# Patient Record
(demographics unavailable — no encounter records)

---

## 2025-03-14 NOTE — HISTORY OF PRESENT ILLNESS
[FreeTextEntry6] : weight check breastfeeding 10-11x a day pumping-getting 2 ounces when pumping also giving 2 ounces of formula stool-3x a day-yellow, seedy urine-6-7x aday sleeping in bassinet on back-nothing else in bassinet [place]=1

## 2025-03-14 NOTE — DISCUSSION/SUMMARY
[FreeTextEntry1] : lance 7 day old gained weight since last visit 2 days ago will  vit D today mom to restart MVI encouraged breastfeeding 12x a day safety discussed refused RSV vaccine follow up one week for weight check

## 2025-03-14 NOTE — PHYSICAL EXAM
[Yong: ____] : Yong [unfilled] [Normal External Genitalia] : normal external genitalia [Circumcised] : circumcised [NL] : warm, clear [FreeTextEntry9] : cord drying

## 2025-03-17 NOTE — END OF VISIT
[] : Resident [FreeTextEntry3] : recommended Beyfortus but parents declined; discussed benefits of immunization and possible complications of RSV infection return in 2-3 days for close F/U due to significant weight loss ~ 12% from birth weight adequate voiding and stooling, baby appears well-hydrated but jaundiced TCB not significantly elevated, no indication for serum testing

## 2025-03-17 NOTE — DEVELOPMENTAL MILESTONES
[Normal Development] : Normal Development [Calms to adult voice] : calms to adult voice [Reflexively moves arms and legs] : reflexively moves arms and legs [Holds fingers closed] : holds fingers closed [Grasps reflexively] : grasp reflexively

## 2025-03-17 NOTE — HISTORY OF PRESENT ILLNESS
[Born at ___ Wks Gestation] : The patient was born at [unfilled] weeks gestation [] : via normal spontaneous vaginal delivery [[facility]] : at [facility] [(1) _____] : [unfilled] [(5) _____] : [unfilled] [None] : There were no delivery complications [BW: _____] : weight of [unfilled] [DW: _____] : Discharge weight was [unfilled] [Time of Birth: _____] : Time of birth was [unfilled] [Age: ___] : [unfilled] year old mother [G: ___] : G [unfilled] [P: ___] : P [unfilled] [Significant Hx: ____] : The mother's  medical history is significant for [unfilled] [GBS] : GBS positive [Rubella (Immune)] : Rubella immune [GDM] : GDM [Antibiotics: ______] : antibiotics ([unfilled]) [TcB: _____] : Transcutaneous Bilirubin [unfilled] mg/dL [Phototherapy Threshold: _____] : Phototherapy level per Bilitool [unfilled] (mg/dL) [Yes] : Yes [] : Circumcision: Yes [Length: _____] : length of [unfilled] [HC: _____] : head circumference of [unfilled] [Hepatitis B Vaccine Given] : Hepatitis B vaccine given [Breast milk] : breast milk [Expressed Breast milk ___oz/feed] : [unfilled] oz of expressed breast milk per feed [Formula ___ oz/feed] : [unfilled] oz of formula per feed [Hours between feeds ___] : Child is fed every [unfilled] hours [RSV vaccine] : RSV vaccine not received by mother at least 14 days prior to delivery [HepBsAG] : HepBsAg negative [HIV] : HIV negative [HepC] : Hepatitis C negative [VDRL/RPR (Reactive)] : VDRL/RPR nonreactive [de-identified] : 40 [Normal] : Normal [___ voids per day] : [unfilled] voids per day [Frequency of stools: ___] : Frequency of stools: [unfilled]  stools [per day] : per day. [Yellow] : yellow [Loose] : loose consistency [In Bassinet/Crib] : sleeps in bassinet/crib [On back] : sleeps on back [Loose bedding, pillow, toys, and/or bumpers in crib] : no loose bedding, pillow, toys, and/or bumpers in crib [No] : No cigarette smoke exposure [Rear facing car seat in back seat] : Rear facing car seat in back seat [Nirsevimab Given] : Nirsevimab not given [de-identified] : good breastmilk production, but poor latch; feeding mostly [id number]  [FreeTextEntry1] :  Peds not called to delivery. 37.2 wk SGA male born via  to a 27 y/o  mother. Maternal history of GDMA1, VSD repair in . Fetal echo completed on 24, normal fetal cardiac anatomy and function. Maternal labs include Blood Type B+, HIV - , RPR NR , Rubella NI , Hep B - , GBS + (received amp x4). ROM with clear fluids (ROM hours: [id number]). Highest maternal temp: 98.6C. EOS 0.06. Baby emerged vigorous, crying, was warmed, dried, suctioned, and stimulated with APGARS of 9/9. Resuscitation included: bulb suction and stimulation, CPAP for 8min due to nasal flaring and grunting. NICU team assessed baby. Discharge weight 2285g, down 9.7% from birth weight. Discharge Bilirubin transcutaneous 6.4 at 40 hours of life, phototherapy threshold 14.2  Cardiology recommended F/U at 1-2 months of age

## 2025-03-17 NOTE — PHYSICAL EXAM
[Alert] : alert [Normocephalic] : normocephalic [Flat Open Anterior [place]] : flat open anterior fontanelle [PERRL] : PERRL [Red Reflex Bilateral] : red reflex bilateral [Normally Placed Ears] : normally placed ears [Auricles Well Formed] : auricles well formed [Clear Tympanic membranes] : clear tympanic membranes [Light reflex present] : light reflex present [Bony structures visible] : bony structures visible [Patent Auditory Canal] : patent auditory canal [Nares Patent] : nares patent [Palate Intact] : palate intact [Uvula Midline] : uvula midline [Supple, full passive range of motion] : supple, full passive range of motion [Palpable Masses] : no palpable masses [Symmetric Chest Rise] : symmetric chest rise [Clear to Auscultation Bilaterally] : clear to auscultation bilaterally [Regular Rate and Rhythm] : regular rate and rhythm [S1, S2 present] : S1, S2 present [+2 Femoral Pulses] : +2 femoral pulses [Soft] : soft [Bowel Sounds] : bowel sounds present [Umbilical Stump Dry, Clean, Intact] : umbilical stump dry, clean, intact [Splenomegaly] : no splenomegaly [Normal external genitailia] : normal external genitalia [Central Urethral Opening] : central urethral opening [Testicles Descended Bilaterally] : testicles descended bilaterally [Patent] : patent [Normally Placed] : normally placed [No Abnormal Lymph Nodes Palpated] : no abnormal lymph nodes palpated [Symmetric Flexed Extremities] : symmetric flexed extremities [Startle Reflex] : startle reflex present [Suck Reflex] : suck reflex present [Rooting] : rooting reflex present [Palmar Grasp] : palmar grasp present [Plantar Grasp] : plantar reflex present [Symmetric Ziyad] : symmetric [place] [Jaundice] : jaundice [Acute Distress] : no acute distress [Caput Succedaneum] : no caput succedaneum [Cephalohematoma] : no cephalohematoma [Flat Open Posterior [place]] : flat open posterior fontanelle [Icteric sclera] : nonicteric sclera [Discharge] : no discharge [Murmurs] : no murmurs [Tender] : nontender [Distended] : not distended [Hepatomegaly] : no hepatomegaly [Ellison-Ortolani] : negative Ellison-Ortolani [Spinal Dimple] : no spinal dimple [Tuft of Hair] : no tuft of hair

## 2025-03-17 NOTE — DISCUSSION/SUMMARY
[Normal Development] : developmental [No Elimination Concerns] : elimination [No Skin Concerns] : skin [Normal Sleep Pattern] : sleep [Term Infant] : term infant [ Transition] :  transition [ Care] :  care [Nutritional Adequacy] : nutritional adequacy [Parental Well-Being] : parental well-being [Safety] : safety [Hepatitis B In Hospital] : Hepatitis B administered while in the hospital [Mother] : mother [Father] : father [Parental Concerns Addressed] : Parental concerns addressed [FreeTextEntry1] :  Jaime is a 5 d.o. male ex-37 wk with no sig PMHx who presents for a  visit. Patient has lost 11.9% since birth and has continued to lose weight since hospital d/c on 3/9. Patient is feeding [id number] 40-45ml q3. Parents report poor latch. Parents report the patient spits up 2-3x per day, but deny vomiting. No sleep concerns. Patient is voiding 6-7x per day and stooling 2-3x per day which is appropriate.   # WCC - Patient with a concerning percentage of weight loss - Patient should RTC in 2-3 days for weight check - Parents advised to feed 45-60ml EHM or formula at least 8 times per day - Start Vit D - Lactation nurse to evaluate for poor latch  #Jaundice - TcB 13.5; phototherapy threshold 20.2 per bilitool  #Maternal VSD - Unremarkable fetal echo  - Cardiology follow-up in 1-2 months

## 2025-03-17 NOTE — DISCUSSION/SUMMARY
[Normal Development] : developmental [No Elimination Concerns] : elimination [No Skin Concerns] : skin [Normal Sleep Pattern] : sleep [Term Infant] : term infant [ Transition] :  transition [ Care] :  care [Nutritional Adequacy] : nutritional adequacy [Parental Well-Being] : parental well-being [Safety] : safety [Hepatitis B In Hospital] : Hepatitis B administered while in the hospital [Mother] : mother [Father] : father [Parental Concerns Addressed] : Parental concerns addressed [FreeTextEntry1] :  Jaime is a 5 d.o. male ex-37 wk with no sig PMHx who presents for a  visit. Patient has lost 11.9% since birth and has continued to lose weight since hospital d/c on 3/9. Patient is feeding EHM/Pzm433 40-45ml q3. Parents report poor latch. Parents report the patient spits up 2-3x per day, but deny vomiting. No sleep concerns. Patient is voiding 6-7x per day and stooling 2-3x per day which is appropriate.   # WCC - Patient with a concerning percentage of weight loss - Patient should RTC in 2-3 days for weight check - Parents advised to feed 45-60ml EHM or formula at least 8 times per day - Start Vit D - Lactation nurse to evaluate for poor latch  #Jaundice - TcB 13.5; phototherapy threshold 20.2 per bilitool  #Maternal VSD - Unremarkable fetal echo  - Cardiology follow-up in 1-2 months

## 2025-03-17 NOTE — REVIEW OF SYSTEMS
[Jaundice] : jaundice [Negative] : Genitourinary [Intolerance to feeds] : tolerance to feeds [Spitting Up] : spitting up [Vomiting] : no vomiting

## 2025-03-17 NOTE — BEGINNING OF VISIT
[Parents] : parents [] :  [Time Spent: ____ minutes] : Total time spent using  services: [unfilled] minutes. The patient's primary language is not English thus required  services. [Interpreters_IDNumber] : [id number] [TWNoteComboBox1] : Nancy

## 2025-03-17 NOTE — PHYSICAL EXAM
[Alert] : alert [Normocephalic] : normocephalic [Flat Open Anterior [place]] : flat open anterior fontanelle [PERRL] : PERRL [Red Reflex Bilateral] : red reflex bilateral [Normally Placed Ears] : normally placed ears [Auricles Well Formed] : auricles well formed [Clear Tympanic membranes] : clear tympanic membranes [Light reflex present] : light reflex present [Bony structures visible] : bony structures visible [Patent Auditory Canal] : patent auditory canal [Nares Patent] : nares patent [Palate Intact] : palate intact [Uvula Midline] : uvula midline [Supple, full passive range of motion] : supple, full passive range of motion [Palpable Masses] : no palpable masses [Symmetric Chest Rise] : symmetric chest rise [Clear to Auscultation Bilaterally] : clear to auscultation bilaterally [Regular Rate and Rhythm] : regular rate and rhythm [S1, S2 present] : S1, S2 present [+2 Femoral Pulses] : +2 femoral pulses [Soft] : soft [Bowel Sounds] : bowel sounds present [Umbilical Stump Dry, Clean, Intact] : umbilical stump dry, clean, intact [Splenomegaly] : no splenomegaly [Normal external genitailia] : normal external genitalia [Central Urethral Opening] : central urethral opening [Testicles Descended Bilaterally] : testicles descended bilaterally [Patent] : patent [Normally Placed] : normally placed [No Abnormal Lymph Nodes Palpated] : no abnormal lymph nodes palpated [Symmetric Flexed Extremities] : symmetric flexed extremities [Startle Reflex] : startle reflex present [Suck Reflex] : suck reflex present [Rooting] : rooting reflex present [Palmar Grasp] : palmar grasp present [Plantar Grasp] : plantar reflex present [Symmetric Ziyad] : symmetric [place] [Jaundice] : jaundice [Acute Distress] : no acute distress [Caput Succedaneum] : no caput succedaneum [Cephalohematoma] : no cephalohematoma [Flat Open Posterior [place]] : flat open posterior fontanelle [Icteric sclera] : nonicteric sclera [Discharge] : no discharge [Murmurs] : no murmurs [Tender] : nontender [Distended] : not distended [Hepatomegaly] : no hepatomegaly [Ellison-Ortolani] : negative Ellison-Ortolani [Spinal Dimple] : no spinal dimple [Tuft of Hair] : no tuft of hair

## 2025-03-17 NOTE — HISTORY OF PRESENT ILLNESS
[Born at ___ Wks Gestation] : The patient was born at [unfilled] weeks gestation [] : via normal spontaneous vaginal delivery [[facility]] : at [facility] [(1) _____] : [unfilled] [(5) _____] : [unfilled] [None] : There were no delivery complications [BW: _____] : weight of [unfilled] [DW: _____] : Discharge weight was [unfilled] [Time of Birth: _____] : Time of birth was [unfilled] [Age: ___] : [unfilled] year old mother [G: ___] : G [unfilled] [P: ___] : P [unfilled] [Significant Hx: ____] : The mother's  medical history is significant for [unfilled] [GBS] : GBS positive [Rubella (Immune)] : Rubella immune [GDM] : GDM [Antibiotics: ______] : antibiotics ([unfilled]) [TcB: _____] : Transcutaneous Bilirubin [unfilled] mg/dL [Phototherapy Threshold: _____] : Phototherapy level per Bilitool [unfilled] (mg/dL) [Yes] : Yes [] : Circumcision: Yes [Length: _____] : length of [unfilled] [HC: _____] : head circumference of [unfilled] [Hepatitis B Vaccine Given] : Hepatitis B vaccine given [Breast milk] : breast milk [Expressed Breast milk ___oz/feed] : [unfilled] oz of expressed breast milk per feed [Formula ___ oz/feed] : [unfilled] oz of formula per feed [Hours between feeds ___] : Child is fed every [unfilled] hours [RSV vaccine] : RSV vaccine not received by mother at least 14 days prior to delivery [HepBsAG] : HepBsAg negative [HIV] : HIV negative [HepC] : Hepatitis C negative [VDRL/RPR (Reactive)] : VDRL/RPR nonreactive [de-identified] : 40 [Normal] : Normal [___ voids per day] : [unfilled] voids per day [Frequency of stools: ___] : Frequency of stools: [unfilled]  stools [per day] : per day. [Yellow] : yellow [Loose] : loose consistency [In Bassinet/Crib] : sleeps in bassinet/crib [On back] : sleeps on back [Loose bedding, pillow, toys, and/or bumpers in crib] : no loose bedding, pillow, toys, and/or bumpers in crib [No] : No cigarette smoke exposure [Rear facing car seat in back seat] : Rear facing car seat in back seat [Nirsevimab Given] : Nirsevimab not given [de-identified] : good breastmilk production, but poor latch; feeding mostly [id number]  [FreeTextEntry1] :  Peds not called to delivery. 37.2 wk SGA male born via  to a 27 y/o  mother. Maternal history of GDMA1, VSD repair in . Fetal echo completed on 24, normal fetal cardiac anatomy and function. Maternal labs include Blood Type B+, HIV - , RPR NR , Rubella NI , Hep B - , GBS + (received amp x4). ROM with clear fluids (ROM hours: 1P04qin). Highest maternal temp: 98.6C. EOS 0.06. Baby emerged vigorous, crying, was warmed, dried, suctioned, and stimulated with APGARS of 9/9. Resuscitation included: bulb suction and stimulation, CPAP for 8min due to nasal flaring and grunting. NICU team assessed baby. Discharge weight 2285g, down 9.7% from birth weight. Discharge Bilirubin transcutaneous 6.4 at 40 hours of life, phototherapy threshold 14.2  Cardiology recommended F/U at 1-2 months of age

## 2025-03-17 NOTE — BEGINNING OF VISIT
[Parents] : parents [] :  [Time Spent: ____ minutes] : Total time spent using  services: [unfilled] minutes. The patient's primary language is not English thus required  services. [Interpreters_IDNumber] : [id number] [TWNoteComboBox1] : Nancy

## 2025-03-21 NOTE — DISCUSSION/SUMMARY
[FreeTextEntry1] : 14do ex FT presenting for weight check. Pt gaining good weight, 45g per day.  #Abnormal NBS  - lab requisition for thyroid studies given to family  #Cardiology f/u  - family given Cardio # to call and schedule appt  #Vaccinations  - Beyfortis given today  F/u in 2 weeks for 1mo WCC  [] : The components of the vaccine(s) to be administered today are listed in the plan of care. The disease(s) for which the vaccine(s) are intended to prevent and the risks have been discussed with the caretaker.  The risks are also included in the appropriate vaccination information statements which have been provided to the patient's caregiver.  The caregiver has given consent to vaccinate.

## 2025-03-21 NOTE — HISTORY OF PRESENT ILLNESS
[de-identified] : Weight check  [FreeTextEntry6] : Feedin mL Similac Total Care 360 every 2 hours and breast feeding ~3 times per day ~20 min, sometimes both breasts sometimes one breast, pumping twice per day  Wet diapers: 10-11 per day  Stools: 2-3 per day  Weight gain: 45g per day   NBS resulted with mild elevation of TSH  Cardiology f/u recommended at 1mo due to maternal history

## 2025-04-11 NOTE — HISTORY OF PRESENT ILLNESS
[Mother] : mother [Father] : father [Breast milk] : breast milk [Normal] : Normal [___ voids per day] : [unfilled] voids per day [Frequency of stools: ___] : Frequency of stools: [unfilled]  stools [In Bassinet/Crib] : sleeps in bassinet/crib [On back] : sleeps on back [No] : No cigarette smoke exposure [Carbon Monoxide Detectors] : Carbon monoxide detectors at home [Smoke Detectors] : Smoke detectors at home. [NO] : No [Co-sleeping] : no co-sleeping [Loose bedding, pillow, toys, and/or bumpers in crib] : no loose bedding, pillow, toys, and/or bumpers in crib [Pacifier use] : not using pacifier [FreeTextEntry7] : baby is well [de-identified] : none [de-identified] : [id number] 100mL (3.3oz) every 2-3 hours [FreeTextEntry1] : 35do M ex FT presenting for WCC. Patient doing well, no complaints or concerns today.

## 2025-04-11 NOTE — DISCUSSION/SUMMARY
[Normal Growth] : growth [Normal Development] : development  [No Elimination Concerns] : elimination [Continue Regimen] : feeding [No Skin Concerns] : skin [Normal Sleep Pattern] : sleep [None] : no medical problems [Anticipatory Guidance Given] : Anticipatory guidance addressed as per the history of present illness section [Age Approp Vaccines] : Age appropriate vaccines administered [No Medications] : ~He/She~ is not on any medications [Parent/Guardian] : Parent/Guardian [FreeTextEntry1] : 35do ex FT presenting for 1mo WCC. Patient growing and developing well. Primarily formula fed, with 1x breast feeding per day. Parents decline lactation consultation today.   #NBS - repeat tft normal-case closed by KIRK  #Health Maintenance:  - anticipatory guidance for 2mo visit provided  - information regarding 2mo vaccines child is to receive provided  - discussed increasing tummy time   #Cardio f/u  - appt scheduled 4/23   RTC in 1 mo for 2 mo WCC

## 2025-04-11 NOTE — DEVELOPMENTAL MILESTONES
[Normal Development] : Normal Development [None] : none [Alerts to unexpected sound] : alerts to unexpected sound [Holds chin up in prone] : holds chin up in prone [Passed] : passed [FreeTextEntry2] : 0 [Makes brief short vowel sounds] : does not make brief short vowel sounds [FreeTextEntry1] : tummy time 2x per day

## 2025-04-11 NOTE — PHYSICAL EXAM
[Alert] : alert [Normocephalic] : normocephalic [Flat Open Anterior [place]] : flat open anterior fontanelle [PERRL] : PERRL [Red Reflex Bilateral] : red reflex bilateral [Normally Placed Ears] : normally placed ears [Auricles Well Formed] : auricles well formed [Nares Patent] : nares patent [Supple, full passive range of motion] : supple, full passive range of motion [Symmetric Chest Rise] : symmetric chest rise [Clear to Auscultation Bilaterally] : clear to auscultation bilaterally [Regular Rate and Rhythm] : regular rate and rhythm [S1, S2 present] : S1, S2 present [+2 Femoral Pulses] : +2 femoral pulses [Soft] : soft [Bowel Sounds] : bowel sounds present [Normal external genitailia] : normal external genitalia [Central Urethral Opening] : central urethral opening [Testicles Descended Bilaterally] : testicles descended bilaterally [Normally Placed] : normally placed [No Abnormal Lymph Nodes Palpated] : no abnormal lymph nodes palpated [Symmetric Flexed Extremities] : symmetric flexed extremities [Startle Reflex] : startle reflex present [Suck Reflex] : suck reflex present [Rooting] : rooting reflex present [Palmar Grasp] : palmar grasp reflex present [Plantar Grasp] : plantar grasp reflex present [Symmetric Ziyad] : symmetric [place] [Acute Distress] : no acute distress [Discharge] : no discharge [Palpable Masses] : no palpable masses [Murmurs] : no murmurs [Tender] : nontender [Distended] : not distended [Hepatomegaly] : no hepatomegaly [Splenomegaly] : no splenomegaly [Ellison-Ortolani] : negative Ellison-Ortolani [Spinal Dimple] : no spinal dimple [Tuft of Hair] : no tuft of hair [Jaundice] : no jaundice [Rash and/or lesion present] : no rash/lesion

## 2025-04-23 NOTE — PHYSICAL EXAM
[General Appearance - Alert] : alert [General Appearance - In No Acute Distress] : in no acute distress [General Appearance - Well Nourished] : well nourished [General Appearance - Well Developed] : well developed [General Appearance - Well-Appearing] : well appearing [Appearance Of Head] : the head was normocephalic [Facies] : there were no dysmorphic facial features [Sclera] : the conjunctiva were normal [Outer Ear] : the ears and nose were normal in appearance [Examination Of The Oral Cavity] : mucous membranes were moist and pink [Auscultation Breath Sounds / Voice Sounds] : breath sounds clear to auscultation bilaterally [Normal Chest Appearance] : the chest was normal in appearance [Apical Impulse] : quiet precordium with normal apical impulse [Heart Rate And Rhythm] : normal heart rate and rhythm [Heart Sounds] : normal S1 and S2 [Heart Sounds Gallop] : no gallops [Heart Sounds Pericardial Friction Rub] : no pericardial rub [Edema] : no edema [Arterial Pulses] : normal upper and lower extremity pulses with no pulse delay [Heart Sounds Click] : no clicks [Capillary Refill Test] : normal capillary refill [L Axilla] : left axilla [R Axilla] : R axilla [Crescendo-Decrescendo] : crescendo-decrescendo [Systolic] : systolic [II] : a grade 2/6 [LLSB] : LLSB  [Musical] : musical [Bowel Sounds] : normal bowel sounds [Abdomen Soft] : soft [Nondistended] : nondistended [Abdomen Tenderness] : non-tender [Nail Clubbing] : no clubbing  or cyanosis of the fingers [Motor Tone] : normal muscle strength and tone [Cervical Lymph Nodes Enlarged Anterior] : The anterior cervical nodes were normal [Cervical Lymph Nodes Enlarged Posterior] : The posterior cervical nodes were normal [] : no rash [Skin Lesions] : no lesions [Skin Turgor] : normal turgor [Demonstrated Behavior - Infant Nonreactive To Parents] : interactive [Mood] : mood and affect were appropriate for age [Demonstrated Behavior] : normal behavior

## 2025-04-23 NOTE — DISCUSSION/SUMMARY
[FreeTextEntry1] :   2 heart murmurs - still's murmur and PPS  No further pediatric cardiology follow-up is required, but I would be more than happy to see SAYF back in clinic if any further symptoms or concerns arise.

## 2025-04-23 NOTE — CLINICAL NARRATIVE
[Up to Date] : Up to Date [FreeTextEntry2] : Todays weight: 4260g  Discharge weight on 3/9/2025: 2285g  Patient is gaining 43.8g/ day since discharge on 3/9/2025.

## 2025-04-23 NOTE — CARDIOLOGY SUMMARY
[Today's Date] : [unfilled] [FreeTextEntry1] : Normal sinus rhythm with a rate of 167, normal QRS axis, normal intervals, QTc 440.  No evidence of atrial or ventricular enlargement.  Normal T waves and ST segments.  No delta waves.

## 2025-04-23 NOTE — REASON FOR VISIT
[Initial Consultation] : an initial consultation for [Parents] : parents [FreeTextEntry3] : Screening for Cardiovascular Condition

## 2025-04-23 NOTE — CONSULT LETTER
[Today's Date] : [unfilled] [Name] : Name: [unfilled] [] : : ~~ [Today's Date:] : [unfilled] [Dear  ___:] : Dear Dr. [unfilled]: [Consult - Single Provider] : Thank you very much for allowing me to participate in the care of this patient. If you have any questions, please do not hesitate to contact me. [Sincerely,] : Sincerely, [FreeTextEntry4] : Tracy Avery MD [FreeTextEntry5] : [address], [place] [address] [FreeTextEntry6] : [phone]

## 2025-04-23 NOTE — REVIEW OF SYSTEMS
[Breastmilk] : Breastmilk ~M [___ Formula] : [unfilled] Formula  [___ ounces/feeding] : ~AKBAR hendricks/feeding [___ Times/day] : [unfilled] times/day

## 2025-04-23 NOTE — HISTORY OF PRESENT ILLNESS
[FreeTextEntry1] : I had the pleasure of seeing DANTE HERRERA in the pediatric cardiology clinic at [facility] on 2025.  DANTE is a 6-week-old baby boy here for his initial  cardiology evaluation.  His mother, Veronica Brush, had a fetal echocardiogram at 20 weeks gestation due to her own history of a VSD; the fetal echocardiogram was normal, but I recommended that the baby be evaluated by me after birth.  Dante was born at 37 weeks via , IUGR.    Breast milk + similac 4 oz 8-10 times per day  , with no symptoms of persistent tachypnea, diaphoresis or tiring with feedings.  Outside of feedings, no concerns for increased work of breathing, cyanosis, pallor or edema.

## 2025-05-09 NOTE — DEVELOPMENTAL MILESTONES
[Normal Development] : Normal Development [Smiles responsively] : smiles responsively [Vocalizes with simple cooing] : vocalizes with simple cooing [Opens and shuts hands] : opens and shuts hands [Passed] : passed [Lifts head and chest in prone] : does not lift head and chest in prone [FreeTextEntry2] : 0

## 2025-05-09 NOTE — HISTORY OF PRESENT ILLNESS
[Mother] : mother [Father] : father [Breast milk] : breast milk [Formula ___ oz/feed] : [unfilled] oz of formula per feed [___ Feeding per 24 hrs] : a  total of [unfilled] feedings in 24 hours [___ voids per day] : [unfilled] voids per day [Frequency of stools: ___] : Frequency of stools: [unfilled]  stools [per day] : per day. [Yellow] : yellow [In Bassinet/Crib] : sleeps in bassinet/crib [On back] : sleeps on back [No] : No cigarette smoke exposure [Water heater temperature set at <120 degrees F] : Water heater temperature set at <120 degrees F [Rear facing car seat in back seat] : Rear facing car seat in back seat [Carbon Monoxide Detectors] : Carbon monoxide detectors at home [Smoke Detectors] : Smoke detectors at home. [NO] : No [Co-sleeping] : no co-sleeping [Loose bedding, pillow, toys, and/or bumpers in crib] : no loose bedding, pillow, toys, and/or bumpers in crib [Pacifier use] : not using pacifier [Exposure to electronic nicotine delivery system] : No exposure to electronic nicotine delivery system [At risk for exposure to TB] : Not at risk for exposure to Tuberculosis  [FreeTextEntry7] : saw cardiology, noted innocent murmur, cleared by cardiology (no need for cards f/u) [de-identified] : none [de-identified] : feeds similac 360 [FreeTextEntry9] : baby coos, looks around [de-identified] : due for 2m vaccines today

## 2025-05-09 NOTE — DISCUSSION/SUMMARY
[Normal Growth] : growth [Normal Development] : development  [No Elimination Concerns] : elimination [Continue Regimen] : feeding [No Skin Concerns] : skin [Normal Sleep Pattern] : sleep [None] : no medical problems [Anticipatory Guidance Given] : Anticipatory guidance addressed as per the history of present illness section [Parental (Maternal) Well-Being] : parental (maternal) well-being [Infant-Family Synchrony] : infant-family synchrony [Nutritional Adequacy] : nutritional adequacy [Infant Behavior] : infant behavior [Safety] : safety [Age Approp Vaccines] : Age appropriate vaccines administered [No Medications] : ~He/She~ is not on any medications [Parent/Guardian] : Parent/Guardian [] : The components of the vaccine(s) to be administered today are listed in the plan of care. The disease(s) for which the vaccine(s) are intended to prevent and the risks have been discussed with the caretaker.  The risks are also included in the appropriate vaccination information statements which have been provided to the patient's caregiver.  The caregiver has given consent to vaccinate. [de-identified] : due for 2m vaccines today [FreeTextEntry1] : Jaime is an exFT 2m M p/f a 2m WCC. Pt saw outpt cards given FHx of VSD, cards noted innocent murmur, no longer requires cards f/u. No other caregiver concerns. No nutrition or elimination concerns; stooling and voiding appropriately. No oral concerns. No elevated SIDS risks. No safety or exposure concerns. Pt struggles to keep head and chest lifted in prone position (normal tone); spoke w/ family about importance of tummy time at least 30 min daily. Meeting all other developmental milestones; MOC passed edinburgh screening. PE wnl, Still's murmur on exam. Pt recieved 2m vaccines today. Will see next at 4m WCC.

## 2025-05-09 NOTE — PHYSICAL EXAM
[Alert] : alert [Normocephalic] : normocephalic [Flat Open Anterior [place]] : flat open anterior fontanelle [PERRL] : PERRL [Red Reflex Bilateral] : red reflex bilateral [Normally Placed Ears] : normally placed ears [Auricles Well Formed] : auricles well formed [Clear Tympanic membranes] : clear tympanic membranes [Light reflex present] : light reflex present [Bony landmarks visible] : bony landmarks visible [Nares Patent] : nares patent [Palate Intact] : palate intact [Uvula Midline] : uvula midline [Supple, full passive range of motion] : supple, full passive range of motion [Symmetric Chest Rise] : symmetric chest rise [Clear to Auscultation Bilaterally] : clear to auscultation bilaterally [Regular Rate and Rhythm] : regular rate and rhythm [S1, S2 present] : S1, S2 present [+2 Femoral Pulses] : +2 femoral pulses [Soft] : soft [Bowel Sounds] : bowel sounds present [Normal external genitailia] : normal external genitalia [Central Urethral Opening] : central urethral opening [Testicles Descended Bilaterally] : testicles descended bilaterally [Normally Placed] : normally placed [No Abnormal Lymph Nodes Palpated] : no abnormal lymph nodes palpated [Symmetric Flexed Extremities] : symmetric flexed extremities [Startle Reflex] : startle reflex present [Suck Reflex] : suck reflex present [Rooting] : rooting reflex present [Palmar Grasp] : palmar grasp reflex present [Plantar Grasp] : plantar grasp reflex present [Symmetric Ziyad] : symmetric [place] [Acute Distress] : no acute distress [Discharge] : no discharge [Palpable Masses] : no palpable masses [Tender] : nontender [Distended] : not distended [Hepatomegaly] : no hepatomegaly [Splenomegaly] : no splenomegaly [Ellison-Ortolani] : negative Ellison-Ortolani [Spinal Dimple] : no spinal dimple [Tuft of Hair] : no tuft of hair [Rash and/or lesion present] : no rash/lesion [FreeTextEntry8] : stills murmur on exam

## 2025-07-09 NOTE — DISCUSSION/SUMMARY
[Normal Growth] : growth [Normal Development] : development  [No Elimination Concerns] : elimination [Continue Regimen] : feeding [No Skin Concerns] : skin [Normal Sleep Pattern] : sleep [Term Infant] : term infant [None] : no medical problems [Family Functioning] : family functioning [Nutritional Adequacy and Growth] : nutritional adequacy and growth [Infant Development] : infant development [Oral Health] : oral health [Safety] : safety [Age Approp Vaccines] : Age appropriate vaccines administered [DTaP] : diptheria, tetanus and pertussis [HiB] : haemophilus influenzae type B [IPV] : inactivated poliovirus [PCV] : pneumococcal conjugate vaccine [Rotavirus] : rotavirus [] : The components of the vaccine(s) to be administered today are listed in the plan of care. The disease(s) for which the vaccine(s) are intended to prevent and the risks have been discussed with the caretaker.  The risks are also included in the appropriate vaccination information statements which have been provided to the patient's caregiver.  The caregiver has given consent to vaccinate. [FreeTextEntry1] : 4m healthy, ex FT M presenting for WCC. Growth and development appropriate.  Continue breastfeeding, 8-12 feedings per day. Mother should continue prenatal vitamins and avoid alcohol. If formula is needed, recommend iron-fortified formulations, 2-4 oz every 3-4 hrs.  At this time, hold off on solids given poor head control, inability to sit upright unassisted. When ready, may start with single-ingredient purees, recommend introducing allergenic foods such as peanuts early to prevent development of intolerance. When in car, patient should be in rear-facing car seat in back seat.  Put baby to sleep on back, in own crib with no loose or soft bedding.  Lower crib mattress.  Help baby to maintain sleep and feeding routines.  May offer pacifier if needed.  Continue tummy time when awake, recommend increasing frequency and length of tummy time to help baby develop muscles and gain head control.  DTaP #2, Hib #2, IPV #2, PCV20 #2, and Rota #2 given in clinic today.  RTC in 2m for 6m WCC.

## 2025-07-09 NOTE — PHYSICAL EXAM
[Alert] : alert [Normocephalic] : normocephalic [Flat Open Anterior [place]] : flat open anterior fontanelle [PERRL] : PERRL [Normally Placed Ears] : normally placed ears [Auricles Well Formed] : auricles well formed [Nares Patent] : nares patent [Palate Intact] : palate intact [Uvula Midline] : uvula midline [Symmetric Chest Rise] : symmetric chest rise [Clear to Auscultation Bilaterally] : clear to auscultation bilaterally [Regular Rate and Rhythm] : regular rate and rhythm [S1, S2 present] : S1, S2 present [+2 Femoral Pulses] : (+) 2 femoral pulses [Soft] : soft [Bowel Sounds] : bowel sounds present [Normal External Genitalia] : normal external genitalia [Testicles Descended] : testicles descended bilaterally [Patent] : patent [Normally Placed] : normally placed [No Abnormal Lymph Nodes Palpated] : no abnormal lymph nodes palpated [Startle Reflex] : startle reflex present [Plantar Grasp] : plantar grasp reflex present [Symmetric Ziyad] : symmetric ziyad [Dermal Melanocytosis] : Dermal Melanocytosis [Acute Distress] : no acute distress [Discharge] : no discharge [Palpable Masses] : no palpable masses [Murmurs] : no murmurs [Tender] : nontender [Distended] : nondistended [Hepatomegaly] : no hepatomegaly [Splenomegaly] : no splenomegaly [Ellison-Ortolani] : negative Ellison-Ortolani [Allis Sign] : negative Allis sign [Spinal Dimple] : no spinal dimple [Tuft of Hair] : no tuft of hair [Rash or Lesions] : no rash/lesions [de-identified] : +head lag

## 2025-07-09 NOTE — HISTORY OF PRESENT ILLNESS
[Parents] : parents [Breast milk] : breast milk [Hours between feeds ___] : Child is fed every [unfilled] hours [Normal] : Normal [___ voids per day] : [unfilled] voids per day [Frequency of stools: ___] : Frequency of stools: [unfilled]  stools [every ___ day(s)] : every [unfilled] day(s). [Green/brown] : green/brown [Yellow] : yellow [Firm] : firm consistency [In Bassinet/Crib] : sleeps in bassinet/crib [On back] : sleeps on back [Sleeps 12-16 hours per 24 hours (including naps)] : sleeps 12-16 hours per 24 hours (including naps) [Tummy time] : tummy time [No] : No cigarette smoke exposure [Water heater temperature set at <120 degrees F] : Water heater temperature set at <120 degrees F [Rear facing car seat in back seat] : Rear facing car seat in back seat [Carbon Monoxide Detectors] : Carbon monoxide detectors at home [Smoke Detectors] : Smoke detectors at home. [Vitamins ___] : no vitamins [Co-sleeping] : no co-sleeping [Loose bedding, pillow, toys, and/or bumpers in crib] : no loose bedding, pillow, toys, and/or bumpers in crib [Pacifier use] : not using pacifier [Exposure to electronic nicotine delivery system] : No exposure to electronic nicotine delivery system [FreeTextEntry7] : No interval illnesses, UC/ED visits, hospitalizations. [de-identified] : None. [de-identified] : 15-30min per side BF, supplementing with formula [FreeTextEntry3] : Sleeps 7 hours overnight [FreeTextEntry9] : tummy time at least twice a day, 5-10 min at a time [de-identified] : Up to date

## 2025-07-09 NOTE — DEVELOPMENTAL MILESTONES
[Normal Development] : Normal Development [None] : none [Laughs aloud] : laughs aloud [Turns to voice] : turns to voice [Vocalizes with extending cooing] : vocalizes with extending cooing [Supports on elbows & wrists in prone] : supports on elbows and wrists in prone [Keeps hands unfisted] : keeps hands unfisted [Plays with fingers in midline] : plays with fingers in midline [Grasps objects] : grasps objects [Passed] : passed [Rolls over prone to supine] : does not roll over prone to supine [FreeTextEntry2] : 1